# Patient Record
Sex: MALE | Race: WHITE
[De-identification: names, ages, dates, MRNs, and addresses within clinical notes are randomized per-mention and may not be internally consistent; named-entity substitution may affect disease eponyms.]

---

## 2022-07-14 ENCOUNTER — HOSPITAL ENCOUNTER (OUTPATIENT)
Dept: HOSPITAL 19 - COL.ER | Age: 77
Setting detail: OBSERVATION
LOS: 2 days | Discharge: NURSING, REHAB OR LONG-TERM CARE | End: 2022-07-16
Attending: INTERNAL MEDICINE | Admitting: INTERNAL MEDICINE
Payer: MEDICARE

## 2022-07-14 VITALS — HEART RATE: 52 BPM | TEMPERATURE: 97.8 F | DIASTOLIC BLOOD PRESSURE: 75 MMHG | SYSTOLIC BLOOD PRESSURE: 152 MMHG

## 2022-07-14 VITALS — BODY MASS INDEX: 25.25 KG/M2 | WEIGHT: 176.37 LBS | HEIGHT: 70 IN

## 2022-07-14 DIAGNOSIS — E03.9: ICD-10-CM

## 2022-07-14 DIAGNOSIS — Z79.899: ICD-10-CM
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
## 2022-07-14 DIAGNOSIS — N18.30: ICD-10-CM

## 2022-07-14 DIAGNOSIS — R45.1: ICD-10-CM

## 2022-07-14 DIAGNOSIS — R41.0: ICD-10-CM

## 2022-07-14 DIAGNOSIS — I12.9: ICD-10-CM
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
## 2022-07-14 DIAGNOSIS — R74.02: ICD-10-CM

## 2022-07-14 DIAGNOSIS — Z79.890: ICD-10-CM

## 2022-07-14 DIAGNOSIS — Z20.822: ICD-10-CM

## 2022-07-14 DIAGNOSIS — R53.81: Primary | ICD-10-CM

## 2022-07-14 DIAGNOSIS — I10: ICD-10-CM
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
## 2022-07-14 LAB
ALBUMIN SERPL-MCNC: 3.8 GM/DL (ref 3.4–4.8)
ALP SERPL-CCNC: 107 U/L (ref 40–150)
ALT SERPL-CCNC: 34 U/L (ref 0–55)
ANION GAP SERPL CALC-SCNC: 15 MMOL/L (ref 7–16)
AST SERPL-CCNC: 24 U/L (ref 5–34)
BASE EXCESS BLDA CALC-SCNC: -0.2 MMOL/L (ref -2–2)
BASOPHILS # BLD: 0.1 K/MM3 (ref 0–0.2)
BASOPHILS NFR BLD AUTO: 1.1 % (ref 0–2)
BILIRUB SERPL-MCNC: 0.4 MG/DL (ref 0.2–1.2)
BUN SERPL-MCNC: 22 MG/DL (ref 8–26)
CALCIUM SERPL-MCNC: 9.4 MG/DL (ref 8.4–10.2)
CHLORIDE SERPL-SCNC: 104 MMOL/L (ref 98–107)
CO2 BLDA-SCNC: 22.2 MMOL/L
CO2 SERPL-SCNC: 23 MMOL/L (ref 23–31)
CREAT SERPL-SCNC: 1.44 MG/DL (ref 0.72–1.25)
EOSINOPHIL # BLD: 0.5 K/MM3 (ref 0–0.7)
EOSINOPHIL NFR BLD: 6.1 % (ref 0–4)
ERYTHROCYTE [DISTWIDTH] IN BLOOD BY AUTOMATED COUNT: 12.9 % (ref 11.5–14.5)
GLUCOSE SERPL-MCNC: 110 MG/DL (ref 70–99)
GRANULOCYTES # BLD AUTO: 49.8 % (ref 42.2–75.2)
HCO3 BLDA-SCNC: 21.4 MEQ/L (ref 22–26)
HCT VFR BLD AUTO: 44.6 % (ref 42–52)
HGB BLD-MCNC: 14.7 G/DL (ref 13.5–18)
INHALED O2 CONCENTRATION: 21 %
LYMPHOCYTES # BLD: 2.8 K/MM3 (ref 1.2–3.4)
LYMPHOCYTES NFR BLD: 33.1 % (ref 20–51)
MCH RBC QN AUTO: 33 PG (ref 27–31)
MCHC RBC AUTO-ENTMCNC: 33 G/DL (ref 33–37)
MCV RBC AUTO: 99 FL (ref 80–100)
MONOCYTES # BLD: 0.8 K/MM3 (ref 0.1–0.6)
MONOCYTES NFR BLD AUTO: 9.3 % (ref 1.7–9.3)
NEUTROPHILS # BLD: 4.2 K/MM3 (ref 1.4–6.5)
PCO2 BLDA: 27.2 MMHG (ref 35–45)
PH UR STRIP.AUTO: 7 [PH] (ref 5–8)
PLATELET # BLD AUTO: 325 K/MM3 (ref 130–400)
PMV BLD AUTO: 10.7 FL (ref 7.4–10.4)
PO2 BLDA: 82.7 MMHG (ref 80–100)
POTASSIUM SERPL-SCNC: 4.4 MMOL/L (ref 3.5–4.5)
PROT SERPL-MCNC: 7.6 GM/DL (ref 6.2–8.1)
RBC # BLD AUTO: 4.49 M/MM3 (ref 4.2–5.6)
RBC # UR STRIP.AUTO: (no result) /UL
RBC # UR: >50 /HPF (ref 0–2)
SAO2 % BLDA: 96.6 % (ref 92–100)
SODIUM SERPL-SCNC: 142 MMOL/L (ref 136–145)
SP GR UR STRIP.AUTO: 1.01 (ref 1–1.03)
TROPONIN I SERPL-MCNC: < 0.01 NG/ML (ref 0–0.03)
URN COLLECT METHOD CLASS: (no result)

## 2022-07-14 PROCEDURE — G0378 HOSPITAL OBSERVATION PER HR: HCPCS

## 2022-07-14 NOTE — NUR
PT. TO ROOM 322 FROM ER FOR PNEMONIA. PT. ALERT AND ORIENTED TO SELF ONLY. PT.
HAS AN INT TO LEFT HAND. NO SKIN CONDITIONS. PT. HAD PREVIOUSLY PULLED OUT AN
IV TO THE LEFT AC. PT. WAS HELPED TO THE BATHROOM. PT HAS AN UNSTEADY GAIT.
APPEARS TO BE IN NO ACUTE DISTRESS. BELONGS INCLUDING A WHEELCHAIR WITH
PT IN ROOM. ORIENTED TO ROOM. CALL LIGHT IN REACH. BED ALARM ON. NO FURHTER
NEEDS AT THIS TIME.

## 2022-07-15 VITALS — HEART RATE: 49 BPM | TEMPERATURE: 97.7 F | DIASTOLIC BLOOD PRESSURE: 56 MMHG | SYSTOLIC BLOOD PRESSURE: 117 MMHG

## 2022-07-15 VITALS — HEART RATE: 57 BPM | DIASTOLIC BLOOD PRESSURE: 74 MMHG | SYSTOLIC BLOOD PRESSURE: 152 MMHG | TEMPERATURE: 98.9 F

## 2022-07-15 VITALS — DIASTOLIC BLOOD PRESSURE: 68 MMHG | SYSTOLIC BLOOD PRESSURE: 136 MMHG | HEART RATE: 59 BPM | TEMPERATURE: 97.7 F

## 2022-07-15 VITALS — DIASTOLIC BLOOD PRESSURE: 64 MMHG | SYSTOLIC BLOOD PRESSURE: 143 MMHG | TEMPERATURE: 98.6 F | HEART RATE: 82 BPM

## 2022-07-15 VITALS — DIASTOLIC BLOOD PRESSURE: 58 MMHG | SYSTOLIC BLOOD PRESSURE: 136 MMHG | HEART RATE: 61 BPM | TEMPERATURE: 98 F

## 2022-07-15 VITALS — SYSTOLIC BLOOD PRESSURE: 159 MMHG | TEMPERATURE: 97.7 F | DIASTOLIC BLOOD PRESSURE: 73 MMHG | HEART RATE: 60 BPM

## 2022-07-15 VITALS — TEMPERATURE: 97.6 F | SYSTOLIC BLOOD PRESSURE: 152 MMHG | HEART RATE: 54 BPM | DIASTOLIC BLOOD PRESSURE: 85 MMHG

## 2022-07-15 LAB
ALBUMIN SERPL-MCNC: 3.7 GM/DL (ref 3.4–4.8)
ANION GAP SERPL CALC-SCNC: 12 MMOL/L (ref 7–16)
BASOPHILS # BLD: 0.1 K/MM3 (ref 0–0.2)
BASOPHILS NFR BLD AUTO: 1.2 % (ref 0–2)
BUN SERPL-MCNC: 18 MG/DL (ref 8–26)
CALCIUM SERPL-MCNC: 9.3 MG/DL (ref 8.4–10.2)
CHLORIDE SERPL-SCNC: 106 MMOL/L (ref 98–107)
CO2 SERPL-SCNC: 25 MMOL/L (ref 23–31)
CREAT SERPL-SCNC: 1.41 MG/DL (ref 0.72–1.25)
EOSINOPHIL # BLD: 0.6 K/MM3 (ref 0–0.7)
EOSINOPHIL NFR BLD: 7 % (ref 0–4)
ERYTHROCYTE [DISTWIDTH] IN BLOOD BY AUTOMATED COUNT: 12.8 % (ref 11.5–14.5)
GLUCOSE SERPL-MCNC: 130 MG/DL (ref 70–99)
GRANULOCYTES # BLD AUTO: 62.4 % (ref 42.2–75.2)
HCT VFR BLD AUTO: 44.3 % (ref 42–52)
HGB BLD-MCNC: 14.8 G/DL (ref 13.5–18)
LYMPHOCYTES # BLD: 2 K/MM3 (ref 1.2–3.4)
LYMPHOCYTES NFR BLD: 22.8 % (ref 20–51)
MAGNESIUM SERPL-MCNC: 1.8 MG/DL (ref 1.6–2.6)
MCH RBC QN AUTO: 33 PG (ref 27–31)
MCHC RBC AUTO-ENTMCNC: 33 G/DL (ref 33–37)
MCV RBC AUTO: 98 FL (ref 80–100)
MONOCYTES # BLD: 0.5 K/MM3 (ref 0.1–0.6)
MONOCYTES NFR BLD AUTO: 6.1 % (ref 1.7–9.3)
NEUTROPHILS # BLD: 5.3 K/MM3 (ref 1.4–6.5)
PHOSPHATE SERPL-MCNC: 2.6 MG/DL (ref 2.3–4.7)
PLATELET # BLD AUTO: 353 K/MM3 (ref 130–400)
PMV BLD AUTO: 10.3 FL (ref 7.4–10.4)
POTASSIUM SERPL-SCNC: 4 MMOL/L (ref 3.5–4.5)
RBC # BLD AUTO: 4.52 M/MM3 (ref 4.2–5.6)
SODIUM SERPL-SCNC: 143 MMOL/L (ref 136–145)

## 2022-07-15 NOTE — NUR
contacted Liz at [facility] and faxed clinical
updates. AMBER requested copy of DPOA-HC, which Liz faxed to the surgical floor.
AMBER placed it in chart. BrotherToño (ph#[phone]) is DPOA-HC.
 
AMBER contacted patient's brother, Toño to complete intake as patient is not
alert and oriented. Toño advised patient is a resident of [facility] and will return there at time of discharge. Patient sees Dr. Graves
for primary care. AMBER advised Toño that she would coordinate with
[facility] at time of discharge. Toño had no questions or concerns at this
time.
 
Discharge Plan: [facility]

## 2022-07-15 NOTE — NUR
ASSESSMENT COMPLETE. PT. SITTING IN BED. ALERT BUT ONLY ORIENTED TO PERSON.
APPEARS TO BE IN NO ACUTE DISTRESS. ANTIBIOTICS INFUSING TO LEFT FOREARM. TELE
IN PLACE. CALL LIGHT IN REACH. WILL CONTINUE TO MONITOR.

## 2022-07-15 NOTE — NUR
PATIENT FOUND TO HAVE PULLED LEFT HAND IV WHILE IV ABX INFUSING. RESTARTED 22
GAUGE IV INTO LEFT FORARM AND IV ABX RESUMED. REPORT GIVEN TO NIGHT SHIFT RN.

## 2022-07-15 NOTE — NUR
PT INCONTINENT OF BOWEL AND HAS BEEN CLEANED UP. BROTHER IN TO VISIT WITH PT.
NO OTHER NEEDS AT THIS TIME. CALL LIGHT WITHIN REACH.

## 2022-07-15 NOTE — NUR
PT IS ORIENTED TO PERSON AND IS CONFUSED. HAS A HX OF KNOWN DEMENTIA. PT
REFUSED ULTRASOUND TEST THIS MORNING. REQUESTED MEDICATION TO HELP CALM PT.
AM MEDS GIVEN AND ASSESSMENT COMPLETED.  VS ARE STABLE. MOVED PT UP IN BED FOR
BREAKFAST. NO OTHER NEEDS AT THIS TIME. CALL LIGHT WITHIN REACH.

## 2022-07-16 VITALS — HEART RATE: 75 BPM | TEMPERATURE: 97.8 F | SYSTOLIC BLOOD PRESSURE: 133 MMHG | DIASTOLIC BLOOD PRESSURE: 72 MMHG

## 2022-07-16 VITALS — DIASTOLIC BLOOD PRESSURE: 86 MMHG | HEART RATE: 61 BPM | SYSTOLIC BLOOD PRESSURE: 151 MMHG

## 2022-07-16 LAB
ALBUMIN SERPL-MCNC: 3.6 GM/DL (ref 3.4–4.8)
ANION GAP SERPL CALC-SCNC: 13 MMOL/L (ref 7–16)
BASOPHILS # BLD: 0.1 K/MM3 (ref 0–0.2)
BASOPHILS NFR BLD AUTO: 1.2 % (ref 0–2)
BUN SERPL-MCNC: 13 MG/DL (ref 8–26)
CALCIUM SERPL-MCNC: 9.3 MG/DL (ref 8.4–10.2)
CHLORIDE SERPL-SCNC: 107 MMOL/L (ref 98–107)
CO2 SERPL-SCNC: 23 MMOL/L (ref 23–31)
CREAT SERPL-SCNC: 1.3 MG/DL (ref 0.72–1.25)
EOSINOPHIL # BLD: 0.8 K/MM3 (ref 0–0.7)
EOSINOPHIL NFR BLD: 7.6 % (ref 0–4)
ERYTHROCYTE [DISTWIDTH] IN BLOOD BY AUTOMATED COUNT: 12.6 % (ref 11.5–14.5)
GLUCOSE SERPL-MCNC: 97 MG/DL (ref 70–99)
GRANULOCYTES # BLD AUTO: 59.6 % (ref 42.2–75.2)
HCT VFR BLD AUTO: 43 % (ref 42–52)
HGB BLD-MCNC: 14.3 G/DL (ref 13.5–18)
LYMPHOCYTES # BLD: 2.6 K/MM3 (ref 1.2–3.4)
LYMPHOCYTES NFR BLD: 24 % (ref 20–51)
MAGNESIUM SERPL-MCNC: 1.8 MG/DL (ref 1.6–2.6)
MCH RBC QN AUTO: 32 PG (ref 27–31)
MCHC RBC AUTO-ENTMCNC: 33 G/DL (ref 33–37)
MCV RBC AUTO: 97 FL (ref 80–100)
MONOCYTES # BLD: 0.8 K/MM3 (ref 0.1–0.6)
MONOCYTES NFR BLD AUTO: 7.2 % (ref 1.7–9.3)
NEUTROPHILS # BLD: 6.4 K/MM3 (ref 1.4–6.5)
PHOSPHATE SERPL-MCNC: 2.7 MG/DL (ref 2.3–4.7)
PLATELET # BLD AUTO: 371 K/MM3 (ref 130–400)
PMV BLD AUTO: 10.4 FL (ref 7.4–10.4)
POTASSIUM SERPL-SCNC: 3.9 MMOL/L (ref 3.5–4.5)
RBC # BLD AUTO: 4.44 M/MM3 (ref 4.2–5.6)
SODIUM SERPL-SCNC: 143 MMOL/L (ref 136–145)

## 2022-07-16 NOTE — NUR
PATIENT DISCHARGING BACK TO [facility], SEE ORDERS. DC'D TELE. DC'D
LEFT FORARM IV AND COVERED SITE WITH GAUZE. PATIENT 2 ASSIST TO GET DRESSED.
INFO PACKET GIVEN TO . CALLED REPORT TO NURSE AT [facility]. PATIENT
DISCHARGED.

## 2022-07-16 NOTE — NUR
AT BEDSIDE. PATIENT MEDICALLY CLEARED TO GO BACK TO [facility] WHERE HE
LIVES. IV ABX CHANGED TO ORAL. SEE DISCHARGE ORDERS.  NOTIFIED
TO MAKE DISCHARGE ARRANGMENTS.

## 2022-07-16 NOTE — NUR
PT. BECAME AGIGIATED AND STARTED HITTING THE NURSING AIDS WHEN THEY WERE
CLEANING UP INCONTIENCE EPISODE. OLANZAPINE INJECTION WAS GIVEN PER ORDER.

## 2022-07-16 NOTE — NUR
SW  informed that patient would be discharged back to [facility]. SW contacted facility to inform of information. Facility stated they
would pick patient up on this day around 1015am. SW informed nursing staff and
documentation faxed to facility. Nothing futher.